# Patient Record
Sex: MALE | Race: WHITE | Employment: FULL TIME | URBAN - METROPOLITAN AREA
[De-identification: names, ages, dates, MRNs, and addresses within clinical notes are randomized per-mention and may not be internally consistent; named-entity substitution may affect disease eponyms.]

---

## 2019-08-07 ENCOUNTER — HOSPITAL ENCOUNTER (EMERGENCY)
Age: 34
Discharge: HOME | End: 2019-08-07
Attending: EMERGENCY MEDICINE
Payer: MEDICARE

## 2019-08-07 VITALS
SYSTOLIC BLOOD PRESSURE: 131 MMHG | HEIGHT: 71 IN | OXYGEN SATURATION: 98 % | WEIGHT: 273.15 LBS | HEART RATE: 76 BPM | BODY MASS INDEX: 38.24 KG/M2 | RESPIRATION RATE: 16 BRPM | DIASTOLIC BLOOD PRESSURE: 75 MMHG | TEMPERATURE: 97.8 F

## 2019-08-07 DIAGNOSIS — J20.9 ACUTE BRONCHITIS, UNSPECIFIED ORGANISM: Primary | ICD-10-CM

## 2019-08-07 PROCEDURE — 99282 EMERGENCY DEPT VISIT SF MDM: CPT

## 2019-08-07 RX ORDER — AZITHROMYCIN 250 MG/1
TABLET, FILM COATED ORAL
Qty: 6 TAB | Refills: 0 | Status: SHIPPED | OUTPATIENT
Start: 2019-08-07 | End: 2020-01-23

## 2019-08-07 NOTE — ED PROVIDER NOTES
HPI the patient is a 59-year-old white male  who presents to the emergency room with acute symptoms of upper respiratory infection with runny nose and nasal congestion and a cough productive of scant yellow sputum. He was concerned about the possibility of sinusitis. He has no tooth or face pain associated with this. He was offered an x-ray of his sinuses but declined.   I will treat him for a bronchitis with Z-Darshan with close follow-up by his PCP    Past Medical History:   Diagnosis Date    Allergic rhinitis due to pollen     Asthma     Bipolar I disorder, most recent episode (or current) depressed, unspecified     Depression     Gastrointestinal disorder     gerd    GERD (gastroesophageal reflux disease)     Otitis media     Tick bite 6/8/2010       Past Surgical History:   Procedure Laterality Date    HX ADENOIDECTOMY      age 8   [facility] ENDOSCOPY      HX TONSILLECTOMY      age 8   [facility] TYMPANOPLASTY      STRABISMUS SURG,REPAIR DETACH [address]           Family History:   Problem Relation Age of Onset    Diabetes Father        Social History     Socioeconomic History    Marital status:      Spouse name: Not on file    Number of children: Not on file    Years of education: Not on file    Highest education level: Not on file   Occupational History    Not on file   Social Needs    Financial resource strain: Not on file    Food insecurity:     Worry: Not on file     Inability: Not on file    Transportation needs:     Medical: Not on file     Non-medical: Not on file   Tobacco Use    Smoking status: Never Smoker    Smokeless tobacco: Never Used   Substance and Sexual Activity    Alcohol use: No     Comment: once yearly     Drug use: No    Sexual activity: Yes     Partners: Female   Lifestyle    Physical activity:     Days per week: Not on file     Minutes per session: Not on file    Stress: Not on file   Relationships    Social connections:     Talks on phone: Not on file     Gets together: Not on file     Attends Yarsanism service: Not on file     Active member of club or organization: Not on file     Attends meetings of clubs or organizations: Not on file     Relationship status: Not on file    Intimate partner violence:     Fear of current or ex partner: Not on file     Emotionally abused: Not on file     Physically abused: Not on file     Forced sexual activity: Not on file   Other Topics Concern    Not on file   Social History Narrative    Not on file         ALLERGIES: Patient has no known allergies. Review of Systems   All other systems reviewed and are negative. Vitals:    08/07/19 1024   BP: 131/75   Pulse: 76   Resp: 16   Temp: 97.8 °F (36.6 °C)   SpO2: 98%   Weight: 123.9 kg (273 lb 2.4 oz)   Height: 5' 11\" (1.803 m)            Physical Exam   Constitutional: He is oriented to person, place, and time. He appears well-developed and well-nourished. HENT:   Head: Normocephalic and atraumatic. Mouth/Throat: Oropharynx is clear and moist. No oropharyngeal exudate. Eyes: Conjunctivae are normal. No scleral icterus. Neck: Neck supple. No thyromegaly present. Cardiovascular: Normal rate, regular rhythm and normal heart sounds. Exam reveals no gallop and no friction rub. No murmur heard. Pulmonary/Chest: Effort normal and breath sounds normal. No stridor. No respiratory distress. He has no wheezes. He has no rales. Abdominal: Soft. Bowel sounds are normal. There is no tenderness. There is no rebound and no guarding. Musculoskeletal: Normal range of motion. Lymphadenopathy:     He has no cervical adenopathy. Neurological: He is alert and oriented to person, place, and time. Skin: Skin is warm and dry. Psychiatric: He has a normal mood and affect. Nursing note and vitals reviewed.        MDM       Procedures

## 2019-08-07 NOTE — LETTER
[facility] EMERGENCY DEPT 
[address] [facility] [place] [address] 
[phone] Work/School Note Date: 8/7/2019 To Whom It May concern: 
 
Ajith Caba was seen and treated today in the emergency room by the following provider(s): 
Attending Provider: Augustine Rivas MD. Ajith Caba may return to work on 08/08/2019. Sincerely, Jamie Jones RN

## 2020-01-23 ENCOUNTER — HOSPITAL ENCOUNTER (EMERGENCY)
Age: 35
Discharge: HOME | End: 2020-01-23
Attending: EMERGENCY MEDICINE
Payer: MEDICARE

## 2020-01-23 VITALS
HEIGHT: 71 IN | BODY MASS INDEX: 39.29 KG/M2 | SYSTOLIC BLOOD PRESSURE: 142 MMHG | WEIGHT: 280.65 LBS | RESPIRATION RATE: 16 BRPM | HEART RATE: 100 BPM | TEMPERATURE: 98 F | DIASTOLIC BLOOD PRESSURE: 63 MMHG | OXYGEN SATURATION: 95 %

## 2020-01-23 DIAGNOSIS — J01.00 ACUTE MAXILLARY SINUSITIS, RECURRENCE NOT SPECIFIED: Primary | ICD-10-CM

## 2020-01-23 PROCEDURE — 99282 EMERGENCY DEPT VISIT SF MDM: CPT

## 2020-01-23 RX ORDER — AZITHROMYCIN 250 MG/1
TABLET, FILM COATED ORAL
Qty: 6 TAB | Refills: 0 | Status: SHIPPED | OUTPATIENT
Start: 2020-01-23 | End: 2022-10-24

## 2020-01-23 RX ORDER — DEXTROMETHORPHAN POLISTIREX 30 MG/5ML
60 SUSPENSION ORAL 2 TIMES DAILY
COMMUNITY

## 2020-01-23 NOTE — ED PROVIDER NOTES
HPI   The patient is a 81-year-old white male body aches cough pressure in his sinuses but no fever or chills. He has a history of bipolar disorder depression otitis media and is status post tonsillectomy in the past.  He denies any ear pain pressure over his maxillary and frontal sinuses.   Past Medical History:   Diagnosis Date    Allergic rhinitis due to pollen     Asthma     Bipolar I disorder, most recent episode (or current) depressed, unspecified     Depression     Gastrointestinal disorder     gerd    GERD (gastroesophageal reflux disease)     Otitis media     Tick bite 6/8/2010       Past Surgical History:   Procedure Laterality Date    HX ADENOIDECTOMY      age 8   [facility]      HX TONSILLECTOMY      age 8   [facility] TYMPANOPLASTY      STRABISMUS SURG,REPAIR DETACH [address]           Family History:   Problem Relation Age of Onset    Diabetes Father        Social History     Socioeconomic History    Marital status:      Spouse name: Not on file    Number of children: Not on file    Years of education: Not on file    Highest education level: Not on file   Occupational History    Not on file   Social Needs    Financial resource strain: Not on file    Food insecurity:     Worry: Not on file     Inability: Not on file    Transportation needs:     Medical: Not on file     Non-medical: Not on file   Tobacco Use    Smoking status: Never Smoker    Smokeless tobacco: Never Used   Substance and Sexual Activity    Alcohol use: No     Comment: once yearly     Drug use: No    Sexual activity: Yes     Partners: Female   Lifestyle    Physical activity:     Days per week: Not on file     Minutes per session: Not on file    Stress: Not on file   Relationships    Social connections:     Talks on phone: Not on file     Gets together: Not on file     Attends Shinto service: Not on file     Active member of club or organization: Not on file     Attends meetings of clubs or organizations: Not on file     Relationship status: Not on file    Intimate partner violence:     Fear of current or ex partner: Not on file     Emotionally abused: Not on file     Physically abused: Not on file     Forced sexual activity: Not on file   Other Topics Concern    Not on file   Social History Narrative    Not on file         ALLERGIES: Patient has no known allergies. Review of Systems   All other systems reviewed and are negative. Vitals:    01/23/20 1107   BP: 142/63   Pulse: 100   Resp: 16   Temp: 98 °F (36.7 °C)   SpO2: 95%   Weight: 127.3 kg (280 lb 10.3 oz)   Height: 5' 11\" (1.803 m)            Physical Exam  Vitals signs and nursing note reviewed. Constitutional:       Appearance: He is well-developed. HENT:      Head: Normocephalic and atraumatic. Comments: Tender over maxillary and frontal sinuses throat red but no increased tonsils or exudates neck supple     Mouth/Throat:      Pharynx: No oropharyngeal exudate. Eyes:      General: No scleral icterus. Conjunctiva/sclera: Conjunctivae normal.   Neck:      Musculoskeletal: Neck supple. Thyroid: No thyromegaly. Cardiovascular:      Rate and Rhythm: Normal rate and regular rhythm. Heart sounds: Normal heart sounds. No murmur. No friction rub. No gallop. Pulmonary:      Effort: Pulmonary effort is normal. No respiratory distress. Breath sounds: Normal breath sounds. No stridor. No wheezing or rales. Abdominal:      General: Bowel sounds are normal.      Palpations: Abdomen is soft. Tenderness: There is no tenderness. There is no guarding or rebound. Musculoskeletal: Normal range of motion. Lymphadenopathy:      Cervical: No cervical adenopathy. Skin:     General: Skin is warm and dry. Neurological:      Mental Status: He is alert and oriented to person, place, and time.           MDM       Procedures

## 2020-06-02 ENCOUNTER — HOSPITAL ENCOUNTER (EMERGENCY)
Age: 35
Discharge: HOME | End: 2020-06-02
Attending: EMERGENCY MEDICINE
Payer: MEDICARE

## 2020-06-02 ENCOUNTER — APPOINTMENT (OUTPATIENT)
Dept: CT IMAGING | Age: 35
End: 2020-06-02
Attending: EMERGENCY MEDICINE
Payer: MEDICARE

## 2020-06-02 VITALS
SYSTOLIC BLOOD PRESSURE: 127 MMHG | HEART RATE: 84 BPM | BODY MASS INDEX: 38.27 KG/M2 | RESPIRATION RATE: 15 BRPM | WEIGHT: 273.37 LBS | HEIGHT: 71 IN | TEMPERATURE: 97.6 F | DIASTOLIC BLOOD PRESSURE: 83 MMHG | OXYGEN SATURATION: 97 %

## 2020-06-02 DIAGNOSIS — M54.50 RIGHT-SIDED LOW BACK PAIN WITHOUT SCIATICA, UNSPECIFIED CHRONICITY: Primary | ICD-10-CM

## 2020-06-02 DIAGNOSIS — R31.9 HEMATURIA, UNSPECIFIED TYPE: ICD-10-CM

## 2020-06-02 LAB
APPEARANCE UR: CLEAR
BACTERIA URNS QL MICRO: ABNORMAL /HPF
BILIRUB UR QL: NEGATIVE
COLOR UR: ABNORMAL
EPITH CASTS URNS QL MICRO: ABNORMAL /LPF
GLUCOSE UR STRIP.AUTO-MCNC: NEGATIVE MG/DL
HGB UR QL STRIP: ABNORMAL
KETONES UR QL STRIP.AUTO: NEGATIVE MG/DL
LEUKOCYTE ESTERASE UR QL STRIP.AUTO: NEGATIVE
MUCOUS THREADS URNS QL MICRO: ABNORMAL /LPF
NITRITE UR QL STRIP.AUTO: NEGATIVE
PH UR STRIP: 5 [PH] (ref 5–8)
PROT UR STRIP-MCNC: NEGATIVE MG/DL
RBC #/AREA URNS HPF: ABNORMAL /HPF (ref 0–5)
SP GR UR REFRACTOMETRY: 1.02 (ref 1–1.03)
UR CULT HOLD, URHOLD: NORMAL
UROBILINOGEN UR QL STRIP.AUTO: 0.2 EU/DL (ref 0.2–1)
WBC URNS QL MICRO: ABNORMAL /HPF (ref 0–4)

## 2020-06-02 PROCEDURE — 81001 URINALYSIS AUTO W/SCOPE: CPT

## 2020-06-02 PROCEDURE — 74011250636 HC RX REV CODE- 250/636: Performed by: EMERGENCY MEDICINE

## 2020-06-02 PROCEDURE — 99283 EMERGENCY DEPT VISIT LOW MDM: CPT

## 2020-06-02 PROCEDURE — 74176 CT ABD & PELVIS W/O CONTRAST: CPT

## 2020-06-02 PROCEDURE — 96372 THER/PROPH/DIAG INJ SC/IM: CPT

## 2020-06-02 RX ORDER — PREDNISONE 50 MG/1
50 TABLET ORAL DAILY
Qty: 3 TAB | Refills: 0 | Status: SHIPPED | OUTPATIENT
Start: 2020-06-02 | End: 2020-06-05

## 2020-06-02 RX ORDER — KETOROLAC TROMETHAMINE 30 MG/ML
30 INJECTION, SOLUTION INTRAMUSCULAR; INTRAVENOUS ONCE
Status: COMPLETED | OUTPATIENT
Start: 2020-06-02 | End: 2020-06-02

## 2020-06-02 RX ORDER — METHOCARBAMOL 500 MG/1
1000 TABLET, FILM COATED ORAL 4 TIMES DAILY
Qty: 56 TAB | Refills: 0 | Status: SHIPPED | OUTPATIENT
Start: 2020-06-02 | End: 2020-06-09

## 2020-06-02 RX ADMIN — KETOROLAC TROMETHAMINE 30 MG: 30 INJECTION, SOLUTION INTRAMUSCULAR at 13:58

## 2020-06-02 NOTE — ED PROVIDER NOTES
Date of Service:  6/2/2020    Patient:  Jose L Cardona    Chief Complaint:  Back Pain and Hip Pain       HPI:  Jose L Cardona is a 30 y.o.  male who presents for evaluation of back pain. Patient complains of some right low back and flank pain for 2 days. He states it came up suddenly and seems to get worse in certain positions. He notes that if he stands too long or sits too long it aggravates it. Also if he tries to bend over to tie his shoes this makes it worse. He has no abdominal pain. No nausea vomiting diarrhea headaches fevers chills. No chest pain or shortness of breath. No other acute complaints. He does work as a  and is always \"up and down and lifting heavy stuff\" daily. No history of kidney stones. Otherwise he denies any other acute complaints.            Past Medical History:   Diagnosis Date    Allergic rhinitis due to pollen     Asthma     Bipolar I disorder, most recent episode (or current) depressed, unspecified     Depression     Gastrointestinal disorder     gerd    GERD (gastroesophageal reflux disease)     Otitis media     Tick bite 6/8/2010       Past Surgical History:   Procedure Laterality Date    HX ADENOIDECTOMY      age 8   [facility]      HX TONSILLECTOMY      age 8   [facility] TYMPAN[facility]LAST[facility]      STRABISMUS [address] DETACH [address]           Family History:   Problem Relation Age of Onset    Diabetes Father        Social History     Socioeconomic History    Marital status:      Spouse name: Not on file    Number of children: Not on file    Years of education: Not on file    Highest education level: Not on file   Occupational History    Not on file   Social Needs    Financial resource strain: Not on file    Food insecurity     Worry: Not on file     Inability: Not on file    Transportation needs     Medical: Not on file     Non-medical: Not on file   Tobacco Use    Smoking status: Never Smoker    Smokeless tobacco: Never Used   Substance and Sexual Activity    Alcohol use: No     Comment: once yearly     Drug use: No    Sexual activity: Yes     Partners: Female   Lifestyle    Physical activity     Days per week: Not on file     Minutes per session: Not on file    Stress: Not on file   Relationships    Social connections     Talks on phone: Not on file     Gets together: Not on file     Attends Catholic service: Not on file     Active member of club or organization: Not on file     Attends meetings of clubs or organizations: Not on file     Relationship status: Not on file    Intimate partner violence     Fear of current or ex partner: Not on file     Emotionally abused: Not on file     Physically abused: Not on file     Forced sexual activity: Not on file   Other Topics Concern    Not on file   Social History Narrative    Not on file         ALLERGIES: Patient has no known allergies. Review of Systems   Constitutional: Negative for fever. HENT: Negative for hearing loss. Eyes: Negative for visual disturbance. Respiratory: Negative for shortness of breath. Cardiovascular: Negative for chest pain. Gastrointestinal: Negative for abdominal pain. Genitourinary: Negative for flank pain. Musculoskeletal: Positive for back pain. Negative for gait problem, neck pain and neck stiffness. Skin: Negative for rash. Neurological: Negative for dizziness, light-headedness and numbness. Psychiatric/Behavioral: Negative for confusion. Vitals:    06/02/20 1120   BP: 127/83   Pulse: 84   Resp: 15   Temp: 97.6 °F (36.4 °C)   SpO2: 97%   Weight: 124 kg (273 lb 5.9 oz)   Height: 5' 11\" (1.803 m)            Physical Exam  Vitals signs and nursing note reviewed. Constitutional:       General: He is not in acute distress. Appearance: Normal appearance. He is well-developed. He is not ill-appearing, toxic-appearing or diaphoretic. HENT:      Head: Normocephalic and atraumatic.    Eyes:      Conjunctiva/sclera: Conjunctivae normal.   Neck:      Musculoskeletal: Neck supple. Vascular: No JVD. Trachea: No tracheal deviation. Cardiovascular:      Rate and Rhythm: Normal rate and regular rhythm. Heart sounds: No friction rub. Pulmonary:      Effort: Pulmonary effort is normal. No respiratory distress. Abdominal:      General: Abdomen is flat. There is no distension. Palpations: Abdomen is soft. Tenderness: There is no abdominal tenderness. There is no right CVA tenderness, left CVA tenderness, guarding or rebound. Negative signs include Canas's sign, McBurney's sign, psoas sign and obturator sign. Hernia: No hernia is present. Musculoskeletal:         General: No tenderness or deformity. Comments: No midline c/t/l tenderness. Bending at the waste reproduces the pain. The further he bends, the \"tighter\" it gets   Skin:     General: Skin is warm and dry. Capillary Refill: Capillary refill takes less than 2 seconds. Findings: No rash. Neurological:      Mental Status: He is alert and oriented to person, place, and time.    Psychiatric:         Mood and Affect: Mood normal.         Behavior: Behavior normal.          MDM  Number of Diagnoses or Management Options  Hematuria, unspecified type:   Right-sided low back pain without sciatica, unspecified chronicity:        VITAL SIGNS:  Patient Vitals for the past 4 hrs:   Temp Pulse Resp BP SpO2   06/02/20 1120 97.6 °F (36.4 °C) 84 15 127/83 97 %         LABS:  Recent Results (from the past 6 hour(s))   URINALYSIS W/ RFLX MICROSCOPIC    Collection Time: 06/02/20 11:49 AM   Result Value Ref Range    Color YELLOW/STRAW      Appearance CLEAR CLEAR      Specific gravity 1.025 1.003 - 1.030      pH (UA) 5.0 5.0 - 8.0      Protein Negative NEG mg/dL    Glucose Negative NEG mg/dL    Ketone Negative NEG mg/dL    Bilirubin Negative NEG      Blood MODERATE (A) NEG      Urobilinogen 0.2 0.2 - 1.0 EU/dL    Nitrites Negative NEG      Leukocyte Esterase Negative NEG     URINE CULTURE HOLD SAMPLE    Collection Time: 06/02/20 11:49 AM   Result Value Ref Range    Urine culture hold        Urine on hold in Microbiology dept for 2 days. If unpreserved urine is submitted, it cannot be used for addtional testing after 24 hours, recollection will be required. URINE MICROSCOPIC ONLY    Collection Time: 06/02/20 11:49 AM   Result Value Ref Range    WBC 0-4 0 - 4 /hpf    RBC 5-10 0 - 5 /hpf    Epithelial cells FEW FEW /lpf    Bacteria 1+ (A) NEG /hpf    Mucus 2+ (A) NEG /lpf        IMAGING:  CT ABD PELV WO CONT   Final Result   IMPRESSION:    1. Mild patchy multifocal airspace disease within the lingula, anterior right   middle lobe and anterior right lower lobe. Recommend clinical correlation. 2. 3 mm nonobstructing right renal calculus. Medications During Visit:  Medications   ketorolac tromethamine (TORADOL) 60 mg/2 mL injection 30 mg (has no administration in time range)         DECISION MAKING:  Karen Ocasio is a 30 y.o. male who comes in as above. Labs and imaging are as above. Here, patient does not appear distressed. No signs of kidney stones so the hematuria is unexplained however could be from dehydration. He could have also passed a stone. For now we will treat him as though it is musculoskeletal type discomfort given how gets better and worse based on positioning. Patient's CT results concerning the lung findings noted on CT or discussed however patient has absolutely no respiratory symptoms nor has he ever had a fever or any other complaints. Return and follow-up instructions discussed. Patient is agreeable to this plan      IMPRESSION:  1. Right-sided low back pain without sciatica, unspecified chronicity    2.  Hematuria, unspecified type        DISPOSITION:  Discharged      Current Discharge Medication List      START taking these medications    Details   methocarbamoL (Robaxin) 500 mg tablet Take 2 Tabs by mouth four (4) times daily for 7 days. Qty: 56 Tab, Refills: 0      predniSONE (DELTASONE) 50 mg tablet Take 1 Tab by mouth daily for 3 days. Qty: 3 Tab, Refills: 0              Follow-up Information     Follow up With Specialties Details Why Contact Info    Jonah Ortiz MD Family [facility] Schedule an appointment as soon as possible for a visit   [facility]  [phone]              The patient is asked to follow-up with their primary care provider in the next several days. They are to call tomorrow for an appointment. The patient is asked to return promptly for any increased concerns or worsening of symptoms. They can return to this emergency department or any other emergency department.     Procedures

## 2020-06-02 NOTE — ED TRIAGE NOTES
Patient ambulated with a steady gait from the lobby to room 4. In bathroom now giving a urine sample. C/O 1 day of right sided back and hip pain. No trouble with voiding. No other complaints. No nausea or vomiting. Is a  and does heavy duty lifting and twisting.  \"My heated car seats seemed to help the pain on the way here\"

## 2020-06-15 ENCOUNTER — HOSPITAL ENCOUNTER (OUTPATIENT)
Dept: GENERAL RADIOLOGY | Age: 35
Discharge: HOME | End: 2020-06-15
Attending: FAMILY MEDICINE
Payer: MEDICARE

## 2020-06-15 ENCOUNTER — HOSPITAL ENCOUNTER (OUTPATIENT)
Dept: CT IMAGING | Age: 35
Discharge: HOME | End: 2020-06-15
Attending: FAMILY MEDICINE
Payer: MEDICARE

## 2020-06-15 DIAGNOSIS — R91.8 LUNG MASS: ICD-10-CM

## 2020-06-15 DIAGNOSIS — N20.0 CALCULUS OF KIDNEY: ICD-10-CM

## 2020-06-15 PROCEDURE — 74176 CT ABD & PELVIS W/O CONTRAST: CPT

## 2020-06-15 PROCEDURE — 71046 X-RAY EXAM CHEST 2 VIEWS: CPT

## 2020-12-06 NOTE — ED NOTES
The patient was discharged home by Dr Fredna Cooks in stable condition. The patient is alert and oriented, in no respiratory distress and discharge vital signs obtained. The patient's diagnosis, condition and treatment were explained. The patient expressed understanding. A discharge plan has been developed. A  was not involved in the process. Aftercare instructions were given. Pt ambulatory out of the ED.  Patient tolerated IM medication well None

## 2022-04-19 ENCOUNTER — HOSPITAL ENCOUNTER (EMERGENCY)
Age: 37
Discharge: HOME | End: 2022-04-19
Attending: EMERGENCY MEDICINE
Payer: MEDICARE

## 2022-04-19 VITALS
BODY MASS INDEX: 39.2 KG/M2 | RESPIRATION RATE: 17 BRPM | HEIGHT: 71 IN | WEIGHT: 280 LBS | SYSTOLIC BLOOD PRESSURE: 112 MMHG | DIASTOLIC BLOOD PRESSURE: 74 MMHG | TEMPERATURE: 97.7 F | OXYGEN SATURATION: 99 % | HEART RATE: 73 BPM

## 2022-04-19 DIAGNOSIS — R42 DIZZINESS: Primary | ICD-10-CM

## 2022-04-19 LAB
ALBUMIN SERPL-MCNC: 3.6 G/DL (ref 3.5–5)
ALBUMIN/GLOB SERPL: 0.9 {RATIO} (ref 1.1–2.2)
ALP SERPL-CCNC: 105 U/L (ref 45–117)
ALT SERPL-CCNC: 30 U/L (ref 12–78)
ANION GAP SERPL CALC-SCNC: 6 MMOL/L (ref 5–15)
AST SERPL-CCNC: 19 U/L (ref 15–37)
BASOPHILS # BLD: 0 K/UL (ref 0–0.1)
BASOPHILS NFR BLD: 0 % (ref 0–1)
BILIRUB SERPL-MCNC: 0.4 MG/DL (ref 0.2–1)
BUN SERPL-MCNC: 14 MG/DL (ref 6–20)
BUN/CREAT SERPL: 17 (ref 12–20)
CALCIUM SERPL-MCNC: 8.4 MG/DL (ref 8.5–10.1)
CHLORIDE SERPL-SCNC: 108 MMOL/L (ref 97–108)
CO2 SERPL-SCNC: 27 MMOL/L (ref 21–32)
CREAT SERPL-MCNC: 0.83 MG/DL (ref 0.7–1.3)
DIFFERENTIAL METHOD BLD: NORMAL
EOSINOPHIL # BLD: 0.2 K/UL (ref 0–0.4)
EOSINOPHIL NFR BLD: 3 % (ref 0–7)
ERYTHROCYTE [DISTWIDTH] IN BLOOD BY AUTOMATED COUNT: 13.1 % (ref 11.5–14.5)
GLOBULIN SER CALC-MCNC: 3.9 G/DL (ref 2–4)
GLUCOSE BLD STRIP.AUTO-MCNC: 87 MG/DL (ref 65–117)
GLUCOSE SERPL-MCNC: 94 MG/DL (ref 65–100)
HCT VFR BLD AUTO: 44.4 % (ref 36.6–50.3)
HGB BLD-MCNC: 14.1 G/DL (ref 12.1–17)
IMM GRANULOCYTES # BLD AUTO: 0 K/UL (ref 0–0.04)
IMM GRANULOCYTES NFR BLD AUTO: 0 % (ref 0–0.5)
LYMPHOCYTES # BLD: 1.7 K/UL (ref 0.8–3.5)
LYMPHOCYTES NFR BLD: 26 % (ref 12–49)
MCH RBC QN AUTO: 26.5 PG (ref 26–34)
MCHC RBC AUTO-ENTMCNC: 31.8 G/DL (ref 30–36.5)
MCV RBC AUTO: 83.5 FL (ref 80–99)
MONOCYTES # BLD: 0.6 K/UL (ref 0–1)
MONOCYTES NFR BLD: 9 % (ref 5–13)
NEUTS SEG # BLD: 4.1 K/UL (ref 1.8–8)
NEUTS SEG NFR BLD: 62 % (ref 32–75)
NRBC # BLD: 0 K/UL (ref 0–0.01)
NRBC BLD-RTO: 0 PER 100 WBC
PLATELET # BLD AUTO: 256 K/UL (ref 150–400)
PMV BLD AUTO: 11.8 FL (ref 8.9–12.9)
POTASSIUM SERPL-SCNC: 4.3 MMOL/L (ref 3.5–5.1)
PROT SERPL-MCNC: 7.5 G/DL (ref 6.4–8.2)
RBC # BLD AUTO: 5.32 M/UL (ref 4.1–5.7)
SERVICE CMNT-IMP: NORMAL
SODIUM SERPL-SCNC: 141 MMOL/L (ref 136–145)
WBC # BLD AUTO: 6.5 K/UL (ref 4.1–11.1)

## 2022-04-19 PROCEDURE — 36415 COLL VENOUS BLD VENIPUNCTURE: CPT

## 2022-04-19 PROCEDURE — 85025 COMPLETE CBC W/AUTO DIFF WBC: CPT

## 2022-04-19 PROCEDURE — 93005 ELECTROCARDIOGRAM TRACING: CPT

## 2022-04-19 PROCEDURE — 82962 GLUCOSE BLOOD TEST: CPT

## 2022-04-19 PROCEDURE — 99284 EMERGENCY DEPT VISIT MOD MDM: CPT

## 2022-04-19 PROCEDURE — 80053 COMPREHEN METABOLIC PANEL: CPT

## 2022-04-19 RX ORDER — MECLIZINE HYDROCHLORIDE 25 MG/1
25 TABLET ORAL
Qty: 30 TABLET | Refills: 0 | Status: SHIPPED | OUTPATIENT
Start: 2022-04-19 | End: 2022-04-29

## 2022-04-19 RX ORDER — SERTRALINE HYDROCHLORIDE 50 MG/1
50 TABLET, FILM COATED ORAL DAILY
COMMUNITY

## 2022-04-19 NOTE — ED NOTES
MD and RN have reviewed and gave discharge instructions and prescription to the patient. The patient verbalized understanding. The pt left ambulatory with wife w/o complaints of dizziness or SOB.

## 2022-04-19 NOTE — ED PROVIDER NOTES
HPI the patient woke up this morning feeling dizzy and nauseated. He also admits to having shortness of breath. He denies vomiting, fever, chest pain, headache or cough. He was diagnosed with COVID 1 week ago. He had no symptoms and says that his sister developed COVID and he was tested as a contact. Past Medical History:   Diagnosis Date    Allergic rhinitis due to pollen     Asthma     Bipolar I disorder, most recent episode (or current) depressed, unspecified     Depression     Gastrointestinal disorder     gerd    GERD (gastroesophageal reflux disease)     Otitis media     Tick bite 6/8/2010       Past Surgical History:   Procedure Laterality Date    HX ADENOIDECTOMY      age 8   [de-identified] ENDOSCOPY      HX TONSILLECTOMY      age 8   [de-identified] TYMPANOPLASTY      STRABISMUS [address]           Family History:   Problem Relation Age of Onset    Diabetes Father        Social History     Socioeconomic History    Marital status:      Spouse name: Not on file    Number of children: Not on file    Years of education: Not on file    Highest education level: Not on file   Occupational History    Not on file   Tobacco Use    Smoking status: Never Smoker    Smokeless tobacco: Never Used   Substance and Sexual Activity    Alcohol use: No     Comment: once yearly     Drug use: No    Sexual activity: Yes     Partners: Female   Other Topics Concern    Not on file   Social History Narrative    Not on file     Social Determinants of Health     Financial Resource Strain:     Difficulty of Paying Living Expenses: Not on file   Food Insecurity:     Worried About [address] in the Last Year: Not on file    [address] in the Last Year: Not on file   Transportation Needs:     Lack of Transportation (Medical): Not on file    Lack of Transportation (Non-Medical):  Not on file   Physical Activity:     Days of Exercise per Week: Not on file    Minutes of Exercise per Session: Not on file   Stress:     Feeling of Stress : Not on file   Social Connections:     Frequency of Communication with Friends and Family: Not on file    Frequency of Social Gatherings with Friends and Family: Not on file    Attends Restorationism Services: Not on file    Active Member of Clubs or Organizations: Not on file    Attends Club or Organization Meetings: Not on file    Marital Status: Not on file   Intimate Partner Violence:     Fear of Current or Ex-Partner: Not on file    Emotionally Abused: Not on file    Physically Abused: Not on file    Sexually Abused: Not on file   Housing Stability:     Unable to Pay for Housing in the Last Year: Not on file    Number of [place] in the Last Year: Not on file    Unstable Housing in the Last Year: Not on file         ALLERGIES: Patient has no known allergies. Review of Systems   Constitutional: Negative for fever. HENT: Negative for voice change. Eyes: Negative for visual disturbance. Respiratory: Positive for shortness of breath. Negative for cough. Cardiovascular: Negative for chest pain. Gastrointestinal: Positive for nausea. Negative for abdominal pain and vomiting. Genitourinary: Negative for flank pain. Musculoskeletal: Negative for back pain. Skin: Negative for rash. Neurological: Positive for dizziness. Negative for syncope and headaches. Psychiatric/Behavioral: Negative for confusion. Vitals:    04/19/22 0757   BP: 128/87   Pulse: 78   Resp: 15   Temp: 97.8 °F (36.6 °C)   SpO2: 98%   Weight: 127 kg (280 lb)   Height: 5' 11\" (1.803 m)            Physical Exam  Constitutional:       General: He is not in acute distress. Appearance: He is well-developed. HENT:      Head: Normocephalic. Eyes:      Pupils: Pupils are equal, round, and reactive to light. Cardiovascular:      Rate and Rhythm: Normal rate. Heart sounds: No murmur heard.       Pulmonary:      Effort: Pulmonary effort is normal.      Breath sounds: Normal breath sounds. Abdominal:      Palpations: Abdomen is soft. Tenderness: There is no abdominal tenderness. Musculoskeletal:         General: Normal range of motion. Cervical back: Normal range of motion. Skin:     General: Skin is warm and dry. Capillary Refill: Capillary refill takes less than 2 seconds. Neurological:      Mental Status: He is alert. Psychiatric:         Behavior: Behavior normal.          MDM       Procedures ED EKG interpretation:  Rhythm: NSR, rate 75. No acute ST changes. This EKG was interpreted by Celia Cooper MD,ED Provider.

## 2022-04-19 NOTE — ED TRIAGE NOTES
Pt arrived via car and needed assistance with getting out of the car. Pt complaining of dizziness starting at 0400 when awoke.   Pt also complains of SOB when \"I get up\"

## 2022-04-27 LAB
ATRIAL RATE: 75 BPM
CALCULATED P AXIS, ECG09: 30 DEGREES
CALCULATED R AXIS, ECG10: -18 DEGREES
CALCULATED T AXIS, ECG11: 51 DEGREES
DIAGNOSIS, 93000: NORMAL
P-R INTERVAL, ECG05: 202 MS
Q-T INTERVAL, ECG07: 376 MS
QRS DURATION, ECG06: 76 MS
QTC CALCULATION (BEZET), ECG08: 419 MS
VENTRICULAR RATE, ECG03: 75 BPM

## 2022-10-24 ENCOUNTER — APPOINTMENT (OUTPATIENT)
Dept: CT IMAGING | Age: 37
End: 2022-10-24
Attending: EMERGENCY MEDICINE
Payer: MEDICARE

## 2022-10-24 ENCOUNTER — HOSPITAL ENCOUNTER (EMERGENCY)
Age: 37
Discharge: HOME | End: 2022-10-24
Attending: EMERGENCY MEDICINE
Payer: MEDICARE

## 2022-10-24 ENCOUNTER — APPOINTMENT (OUTPATIENT)
Dept: ULTRASOUND IMAGING | Age: 37
End: 2022-10-24
Attending: EMERGENCY MEDICINE
Payer: MEDICARE

## 2022-10-24 VITALS
DIASTOLIC BLOOD PRESSURE: 63 MMHG | HEART RATE: 95 BPM | TEMPERATURE: 97.9 F | HEIGHT: 71 IN | BODY MASS INDEX: 41.05 KG/M2 | WEIGHT: 293.21 LBS | RESPIRATION RATE: 18 BRPM | SYSTOLIC BLOOD PRESSURE: 109 MMHG | OXYGEN SATURATION: 98 %

## 2022-10-24 DIAGNOSIS — R10.13 ABDOMINAL PAIN, EPIGASTRIC: Primary | ICD-10-CM

## 2022-10-24 LAB
ALBUMIN SERPL-MCNC: 3.7 G/DL (ref 3.5–5)
ALBUMIN/GLOB SERPL: 1 {RATIO} (ref 1.1–2.2)
ALP SERPL-CCNC: 108 U/L (ref 45–117)
ALT SERPL-CCNC: 29 U/L (ref 12–78)
ANION GAP SERPL CALC-SCNC: 7 MMOL/L (ref 5–15)
AST SERPL-CCNC: 19 U/L (ref 15–37)
BASOPHILS # BLD: 0 K/UL (ref 0–0.1)
BASOPHILS NFR BLD: 0 % (ref 0–1)
BILIRUB SERPL-MCNC: 0.3 MG/DL (ref 0.2–1)
BUN SERPL-MCNC: 15 MG/DL (ref 6–20)
BUN/CREAT SERPL: 19 (ref 12–20)
CALCIUM SERPL-MCNC: 8.8 MG/DL (ref 8.5–10.1)
CHLORIDE SERPL-SCNC: 106 MMOL/L (ref 97–108)
CO2 SERPL-SCNC: 26 MMOL/L (ref 21–32)
CREAT SERPL-MCNC: 0.79 MG/DL (ref 0.7–1.3)
DIFFERENTIAL METHOD BLD: ABNORMAL
EOSINOPHIL # BLD: 0.1 K/UL (ref 0–0.4)
EOSINOPHIL NFR BLD: 1 % (ref 0–7)
ERYTHROCYTE [DISTWIDTH] IN BLOOD BY AUTOMATED COUNT: 13.2 % (ref 11.5–14.5)
GLOBULIN SER CALC-MCNC: 3.8 G/DL (ref 2–4)
GLUCOSE SERPL-MCNC: 87 MG/DL (ref 65–100)
HCT VFR BLD AUTO: 42.2 % (ref 36.6–50.3)
HGB BLD-MCNC: 14 G/DL (ref 12.1–17)
IMM GRANULOCYTES # BLD AUTO: 0.1 K/UL (ref 0–0.04)
IMM GRANULOCYTES NFR BLD AUTO: 1 % (ref 0–0.5)
LIPASE SERPL-CCNC: 62 U/L (ref 73–393)
LYMPHOCYTES # BLD: 1.8 K/UL (ref 0.8–3.5)
LYMPHOCYTES NFR BLD: 19 % (ref 12–49)
MCH RBC QN AUTO: 28 PG (ref 26–34)
MCHC RBC AUTO-ENTMCNC: 33.2 G/DL (ref 30–36.5)
MCV RBC AUTO: 84.4 FL (ref 80–99)
MONOCYTES # BLD: 0.6 K/UL (ref 0–1)
MONOCYTES NFR BLD: 7 % (ref 5–13)
NEUTS SEG # BLD: 6.8 K/UL (ref 1.8–8)
NEUTS SEG NFR BLD: 73 % (ref 32–75)
NRBC # BLD: 0 K/UL (ref 0–0.01)
NRBC BLD-RTO: 0 PER 100 WBC
PLATELET # BLD AUTO: 252 K/UL (ref 150–400)
PMV BLD AUTO: 11.4 FL (ref 8.9–12.9)
POTASSIUM SERPL-SCNC: 4.1 MMOL/L (ref 3.5–5.1)
PROT SERPL-MCNC: 7.5 G/DL (ref 6.4–8.2)
RBC # BLD AUTO: 5 M/UL (ref 4.1–5.7)
SODIUM SERPL-SCNC: 139 MMOL/L (ref 136–145)
WBC # BLD AUTO: 9.4 K/UL (ref 4.1–11.1)

## 2022-10-24 PROCEDURE — 80053 COMPREHEN METABOLIC PANEL: CPT

## 2022-10-24 PROCEDURE — 85025 COMPLETE CBC W/AUTO DIFF WBC: CPT

## 2022-10-24 PROCEDURE — 74177 CT ABD & PELVIS W/CONTRAST: CPT

## 2022-10-24 PROCEDURE — 74011000636 HC RX REV CODE- 636: Performed by: EMERGENCY MEDICINE

## 2022-10-24 PROCEDURE — 76705 ECHO EXAM OF ABDOMEN: CPT

## 2022-10-24 PROCEDURE — 36415 COLL VENOUS BLD VENIPUNCTURE: CPT

## 2022-10-24 PROCEDURE — 83690 ASSAY OF LIPASE: CPT

## 2022-10-24 PROCEDURE — 99285 EMERGENCY DEPT VISIT HI MDM: CPT

## 2022-10-24 RX ORDER — OMEPRAZOLE 40 MG/1
40 CAPSULE, DELAYED RELEASE ORAL DAILY
Qty: 30 CAPSULE | Refills: 0 | Status: SHIPPED | OUTPATIENT
Start: 2022-10-24 | End: 2022-11-23

## 2022-10-24 RX ADMIN — IOPAMIDOL 100 ML: 755 INJECTION, SOLUTION INTRAVENOUS at 14:36

## 2022-10-24 NOTE — ED NOTES
The patient was discharged by Dr. Zhou De Guzman. One prescription e-scribed to pharmacy. Patient ambulatory to discharge.

## 2022-10-24 NOTE — ED PROVIDER NOTES
The history is provided by the patient and the spouse. Abdominal Pain   This is a new problem. The current episode started 2 days ago. The problem occurs constantly. The problem has not changed since onset. The pain is associated with vomiting. The pain is located in the epigastric region. The quality of the pain is cramping. The pain is moderate. Associated symptoms include nausea and vomiting. Pertinent negatives include no anorexia, no fever, no belching, no diarrhea, no flatus, no hematochezia, no melena, no constipation, no dysuria, no frequency, no hematuria, no headaches, no arthralgias, no myalgias, no trauma, no chest pain, no testicular pain and no back pain. The pain is worsened by eating. The pain is relieved by Nothing. The patient's surgical history non-contributory.      Past Medical History:   Diagnosis Date    Allergic rhinitis due to pollen     Asthma     Bipolar I disorder, most recent episode (or current) depressed, unspecified     Depression     Gastrointestinal disorder     gerd    GERD (gastroesophageal reflux disease)     Otitis media     Tick bite 6/8/2010       Past Surgical History:   Procedure Laterality Date    HX ADENOIDECTOMY      age 8   [de-identified] ENDOSCOPY      HX TONSILLECTOMY      age 8   [de-identified] TYMPANOPLASTY      [place] [address] DETACH [address]           Family History:   Problem Relation Age of Onset    Diabetes Father        Social History     Socioeconomic History    Marital status:      Spouse name: Not on file    Number of children: Not on file    Years of education: Not on file    Highest education level: Not on file   Occupational History    Not on file   Tobacco Use    Smoking status: Never    Smokeless tobacco: Never   Substance and Sexual Activity    Alcohol use: No     Comment: once yearly     Drug use: No    Sexual activity: Yes     Partners: Female   Other Topics Concern    Not on file   Social History Narrative    Not on file     Social Determinants of Health     Financial Resource Strain: Not on file   Food Insecurity: Not on file   Transportation Needs: Not on file   Physical Activity: Not on file   Stress: Not on file   Social Connections: Not on file   Intimate Partner Violence: Not on file   Housing Stability: Not on file         ALLERGIES: Patient has no known allergies. Review of Systems   Constitutional:  Negative for activity change, chills and fever. HENT:  Negative for nosebleeds, sore throat, trouble swallowing and voice change. Eyes:  Negative for visual disturbance. Respiratory:  Negative for shortness of breath. Cardiovascular:  Negative for chest pain and palpitations. Gastrointestinal:  Positive for abdominal pain, nausea and vomiting. Negative for anorexia, constipation, diarrhea, flatus, hematochezia and melena. Genitourinary:  Negative for difficulty urinating, dysuria, frequency, hematuria, testicular pain and urgency. Musculoskeletal:  Negative for arthralgias, back pain, myalgias, neck pain and neck stiffness. Skin:  Negative for color change. Allergic/Immunologic: Negative for immunocompromised state. Neurological:  Negative for dizziness, seizures, syncope, weakness, light-headedness, numbness and headaches. Psychiatric/Behavioral:  Negative for behavioral problems, confusion, hallucinations, self-injury and suicidal ideas. Vitals:    10/24/22 1231   BP: 134/88   Pulse: 95   Resp: 18   Temp: 97.9 °F (36.6 °C)   SpO2: 96%   Weight: 133 kg (293 lb 3.4 oz)   Height: 5' 11\" (1.803 m)            Physical Exam  Vitals and nursing note reviewed. Constitutional:       General: He is not in acute distress. Appearance: He is well-developed. He is not diaphoretic. HENT:      Head: Normocephalic and atraumatic. Eyes:      Pupils: Pupils are equal, round, and reactive to light. Cardiovascular:      Rate and Rhythm: Normal rate and regular rhythm. Heart sounds: Normal heart sounds.  No murmur heard. No friction rub. No gallop. Pulmonary:      Effort: Pulmonary effort is normal. No respiratory distress. Breath sounds: Normal breath sounds. No wheezing. Abdominal:      General: Bowel sounds are normal. There is no distension. Palpations: Abdomen is soft. Tenderness: There is no abdominal tenderness. There is no guarding or rebound. Musculoskeletal:         General: Normal range of motion. Cervical back: Normal range of motion and neck supple. Skin:     General: Skin is warm. Findings: No rash. Neurological:      Mental Status: He is alert and oriented to person, place, and time. Psychiatric:         Behavior: Behavior normal.         Thought Content: Thought content normal.         Judgment: Judgment normal.        MDM    This is a 53-year-old male with past medical history, review of systems, physical exam as above, presenting with complaints of epigastric abdominal pain. Patient states discomfort began over the weekend, states is made worse by eating and drinking. He endorses nausea, with vomiting, dry heaving this morning. He states bowels have been loose, without complaints of diarrhea. He denies fever. He states he has been taking ibuprofen for discomfort with little relief. He denies a history of tobacco or alcohol use, denies a history of abdominal surgeries. Physical exam is remarkable for well-appearing young male, in no acute distress noted to be normotensive, afebrile without tachycardia, satting well on room air. He has a soft abdomen, without inducible tenderness, clear breath sounds to auscultation, regular rate and rhythm without murmurs gallops or rubs. Discussed with patient and wife at bedside, differential includes biliary colic, pancreatitis, gastroenteritis. He states pain is minimal at this time, thus will defer medications, obtain CMP, CBC, UA, lipase, right upper quadrant ultrasound.   We will consider further imaging, reassess, and make a disposition. Procedures    Update:  Lab work, CT imaging, ultrasound without acute process, patient remains in no acute distress. Discussed results at bedside with patient, where he discloses history of GERD, requiring endoscopy, no longer taking PPI. Will encourage him to restart his PPI as this is likely gastritis, follow with his primary care physician and gastroenterology, return precautions given. Patient in agreement with plan.

## 2022-10-24 NOTE — ED TRIAGE NOTES
Pt reports \"I think I'm having issues with my gallbladder\". Pt reports mid abdominal pain over the weekend, abdomen feels \"tight\". Pt vomited and had diarrhea Saturday night, diarrhea has continued.

## 2025-07-01 ENCOUNTER — HOSPITAL ENCOUNTER (EMERGENCY)
Facility: HOSPITAL | Age: 40
Discharge: ANOTHER HOSPITAL | End: 2025-07-02
Attending: EMERGENCY MEDICINE

## 2025-07-01 DIAGNOSIS — R45.851 SUICIDAL IDEATION: Primary | ICD-10-CM

## 2025-07-01 LAB
ALBUMIN SERPL-MCNC: 3.8 G/DL (ref 3.5–5)
ALBUMIN/GLOB SERPL: 1.1 (ref 1.1–2.2)
ALP SERPL-CCNC: 118 U/L (ref 45–117)
ALT SERPL-CCNC: 32 U/L (ref 12–78)
AMPHET UR QL SCN: NEGATIVE
ANION GAP SERPL CALC-SCNC: 8 MMOL/L (ref 2–12)
APAP SERPL-MCNC: <2 UG/ML (ref 10–30)
APPEARANCE UR: ABNORMAL
AST SERPL-CCNC: 20 U/L (ref 15–37)
BACTERIA URNS QL MICRO: NEGATIVE /HPF
BARBITURATES UR QL SCN: NEGATIVE
BASOPHILS # BLD: 0.03 K/UL (ref 0–0.1)
BASOPHILS NFR BLD: 0.3 % (ref 0–1)
BENZODIAZ UR QL: NEGATIVE
BILIRUB SERPL-MCNC: 0.6 MG/DL (ref 0.2–1)
BILIRUB UR QL: NEGATIVE
BUN SERPL-MCNC: 11 MG/DL (ref 6–20)
BUN/CREAT SERPL: 13 (ref 12–20)
CALCIUM SERPL-MCNC: 9 MG/DL (ref 8.5–10.1)
CANNABINOIDS UR QL SCN: NEGATIVE
CHLORIDE SERPL-SCNC: 103 MMOL/L (ref 97–108)
CO2 SERPL-SCNC: 29 MMOL/L (ref 21–32)
COCAINE UR QL SCN: NEGATIVE
COLOR UR: ABNORMAL
CREAT SERPL-MCNC: 0.83 MG/DL (ref 0.7–1.3)
DIFFERENTIAL METHOD BLD: NORMAL
EOSINOPHIL # BLD: 0.06 K/UL (ref 0–0.4)
EOSINOPHIL NFR BLD: 0.7 % (ref 0–7)
EPITH CASTS URNS QL MICRO: ABNORMAL /LPF
ERYTHROCYTE [DISTWIDTH] IN BLOOD BY AUTOMATED COUNT: 12.5 % (ref 11.5–14.5)
ETHANOL SERPL-MCNC: <10 MG/DL (ref 0–0.08)
GLOBULIN SER CALC-MCNC: 3.6 G/DL (ref 2–4)
GLUCOSE SERPL-MCNC: 105 MG/DL (ref 65–100)
GLUCOSE UR STRIP.AUTO-MCNC: NEGATIVE MG/DL
HCT VFR BLD AUTO: 44.1 % (ref 36.6–50.3)
HGB BLD-MCNC: 15.2 G/DL (ref 12.1–17)
HGB UR QL STRIP: NEGATIVE
IMM GRANULOCYTES # BLD AUTO: 0.02 K/UL (ref 0–0.04)
IMM GRANULOCYTES NFR BLD AUTO: 0.2 % (ref 0–0.5)
KETONES UR QL STRIP.AUTO: NEGATIVE MG/DL
LEUKOCYTE ESTERASE UR QL STRIP.AUTO: NEGATIVE
LYMPHOCYTES # BLD: 1.89 K/UL (ref 0.8–3.5)
LYMPHOCYTES NFR BLD: 20.7 % (ref 12–49)
Lab: NORMAL
MCH RBC QN AUTO: 29.2 PG (ref 26–34)
MCHC RBC AUTO-ENTMCNC: 34.5 G/DL (ref 30–36.5)
MCV RBC AUTO: 84.8 FL (ref 80–99)
METHADONE UR QL: NEGATIVE
MONOCYTES # BLD: 0.65 K/UL (ref 0–1)
MONOCYTES NFR BLD: 7.1 % (ref 5–13)
MUCOUS THREADS URNS QL MICRO: ABNORMAL /LPF
NEUTS SEG # BLD: 6.49 K/UL (ref 1.8–8)
NEUTS SEG NFR BLD: 71 % (ref 32–75)
NITRITE UR QL STRIP.AUTO: NEGATIVE
NRBC # BLD: 0 K/UL (ref 0–0.01)
NRBC BLD-RTO: 0 PER 100 WBC
OPIATES UR QL: NEGATIVE
PCP UR QL: NEGATIVE
PH UR STRIP: 6.5 (ref 5–8)
PLATELET # BLD AUTO: 258 K/UL (ref 150–400)
PMV BLD AUTO: 11.3 FL (ref 8.9–12.9)
POTASSIUM SERPL-SCNC: 3.6 MMOL/L (ref 3.5–5.1)
PROT SERPL-MCNC: 7.4 G/DL (ref 6.4–8.2)
PROT UR STRIP-MCNC: NEGATIVE MG/DL
RBC # BLD AUTO: 5.2 M/UL (ref 4.1–5.7)
RBC #/AREA URNS HPF: ABNORMAL /HPF (ref 0–5)
SALICYLATES SERPL-MCNC: <1.7 MG/DL (ref 2.8–20)
SODIUM SERPL-SCNC: 140 MMOL/L (ref 136–145)
SP GR UR REFRACTOMETRY: 1.02 (ref 1–1.03)
UROBILINOGEN UR QL STRIP.AUTO: 1 EU/DL (ref 0.2–1)
WBC # BLD AUTO: 9.1 K/UL (ref 4.1–11.1)
WBC URNS QL MICRO: ABNORMAL /HPF (ref 0–4)

## 2025-07-01 PROCEDURE — 81001 URINALYSIS AUTO W/SCOPE: CPT

## 2025-07-01 PROCEDURE — 99283 EMERGENCY DEPT VISIT LOW MDM: CPT

## 2025-07-01 PROCEDURE — 80179 DRUG ASSAY SALICYLATE: CPT

## 2025-07-01 PROCEDURE — 99285 EMERGENCY DEPT VISIT HI MDM: CPT

## 2025-07-01 PROCEDURE — 82077 ASSAY SPEC XCP UR&BREATH IA: CPT

## 2025-07-01 PROCEDURE — 80053 COMPREHEN METABOLIC PANEL: CPT

## 2025-07-01 PROCEDURE — 80143 DRUG ASSAY ACETAMINOPHEN: CPT

## 2025-07-01 PROCEDURE — 80307 DRUG TEST PRSMV CHEM ANLYZR: CPT

## 2025-07-01 PROCEDURE — 85025 COMPLETE CBC W/AUTO DIFF WBC: CPT

## 2025-07-01 ASSESSMENT — LIFESTYLE VARIABLES
HOW OFTEN DO YOU HAVE A DRINK CONTAINING ALCOHOL: NEVER
HOW OFTEN DO YOU HAVE A DRINK CONTAINING ALCOHOL: NEVER
HOW MANY STANDARD DRINKS CONTAINING ALCOHOL DO YOU HAVE ON A TYPICAL DAY: PATIENT DOES NOT DRINK

## 2025-07-01 ASSESSMENT — PAIN - FUNCTIONAL ASSESSMENT: PAIN_FUNCTIONAL_ASSESSMENT: NONE - DENIES PAIN

## 2025-07-01 NOTE — ED PROVIDER NOTES
[facility]  EMERGENCY DEPARTMENT ENCOUNTER      Pt Name: Blade Friedman  MRN: [id number]  Birthdate 1985  Date of evaluation: 7/1/2025  Provider: Rey Hernandez DO    CHIEF COMPLAINT       Chief Complaint   Patient presents with    Suicidal         HISTORY OF PRESENT ILLNESS   (Location/Symptom, Timing/Onset, Context/Setting, Quality, Duration, Modifying Factors, Severity)  Note limiting factors.   39-year-old male comes in under TDO.  Patient arrives without insurance.  Apparently per the patient, his wife is \"been talking to\" another man in [place].  Today they got into an argument about it and the wife threatened to leave with the kids and the animals.  Patient states that he said that if she did that he would have no reason to live.  This is the only admission that the patient makes.  Per police, a call came out to them by dispatch for possible attempt to death by .  The patient was found in a car with a pistol that he had apparently just unloaded after he saw the police cars show up.  He mention to one of the officers that he was actively suicidal and was placed under TDO and brought to the emergency department.  He denies drug or alcohol use.  He denies any suicidal ideations in the past or any other psychiatric admissions            Review of External Medical Records:     Nursing Notes were reviewed.    REVIEW OF SYSTEMS    (2-9 systems for level 4, 10 or more for level 5)     Review of Systems    Except as noted above the remainder of the review of systems was reviewed and negative.       PAST MEDICAL HISTORY     Past Medical History:   Diagnosis Date    Allergic rhinitis due to pollen     Asthma     Bipolar I disorder, most recent episode (or current) depressed, unspecified     Depression     Gastrointestinal disorder     gerd    GERD (gastroesophageal reflux disease)     Otitis media     Tick bite 6/8/2010         SURGICAL HISTORY       Past Surgical

## 2025-07-01 NOTE — ED NOTES
Patient currently denies suicidal ideation. States these were \"empty threats\". States he has had no clarification of the activity that his spouse has been involved in, but today, she became angry and told him that she was leaving. Patient then states he told his spouse that if she was leaving, then his life was not important. Patient denies accusation that he had a gun to his head.

## 2025-07-01 NOTE — ED TRIAGE NOTES
Patient presents to treatment area via police with [facility] in place for suicidal ideation. Patient states his spouse has been cheating on him and was going to leave him. States she told him she was going to move and take the kids and the dog. Patient then reportedly put a gun to his head. Reports active suicidal ideation due to circumstances.

## 2025-07-01 NOTE — ED NOTES
Suicide screening yields \"low risk\" based on patient responses. Due to the reason for patient presentation, however, patient will be placed on high risk for suicide, despite this screening result.

## 2025-07-02 ENCOUNTER — HOSPITAL ENCOUNTER (INPATIENT)
Facility: HOSPITAL | Age: 40
LOS: 1 days | Discharge: HOME | DRG: 885 | End: 2025-07-03
Attending: STUDENT IN AN ORGANIZED HEALTH CARE EDUCATION/TRAINING PROGRAM | Admitting: STUDENT IN AN ORGANIZED HEALTH CARE EDUCATION/TRAINING PROGRAM
Payer: MEDICARE

## 2025-07-02 VITALS
RESPIRATION RATE: 16 BRPM | TEMPERATURE: 97.5 F | DIASTOLIC BLOOD PRESSURE: 55 MMHG | OXYGEN SATURATION: 96 % | SYSTOLIC BLOOD PRESSURE: 102 MMHG | WEIGHT: 293 LBS | BODY MASS INDEX: 41.02 KG/M2 | HEART RATE: 82 BPM | HEIGHT: 71 IN

## 2025-07-02 PROBLEM — F31.4 BIPOLAR DISORDER, CURRENT EPISODE DEPRESSED, SEVERE, WITHOUT PSYCHOTIC FEATURES (HCC): Status: ACTIVE | Noted: 2025-07-02

## 2025-07-02 PROCEDURE — 6370000000 HC RX 637 (ALT 250 FOR IP): Performed by: PSYCHIATRY & NEUROLOGY

## 2025-07-02 PROCEDURE — 94761 N-INVAS EAR/PLS OXIMETRY MLT: CPT

## 2025-07-02 PROCEDURE — 1240000000 HC EMOTIONAL WELLNESS R&B

## 2025-07-02 RX ORDER — PANTOPRAZOLE SODIUM 40 MG/1
40 TABLET, DELAYED RELEASE ORAL
Status: DISCONTINUED | OUTPATIENT
Start: 2025-07-02 | End: 2025-07-03 | Stop reason: HOSPADM

## 2025-07-02 RX ORDER — OMEPRAZOLE 20 MG/1
40 CAPSULE, DELAYED RELEASE ORAL DAILY
COMMUNITY

## 2025-07-02 RX ORDER — MAGNESIUM HYDROXIDE/ALUMINUM HYDROXICE/SIMETHICONE 120; 1200; 1200 MG/30ML; MG/30ML; MG/30ML
30 SUSPENSION ORAL EVERY 6 HOURS PRN
Status: DISCONTINUED | OUTPATIENT
Start: 2025-07-02 | End: 2025-07-03 | Stop reason: HOSPADM

## 2025-07-02 RX ORDER — POLYETHYLENE GLYCOL 3350 17 G/17G
17 POWDER, FOR SOLUTION ORAL DAILY PRN
Status: DISCONTINUED | OUTPATIENT
Start: 2025-07-02 | End: 2025-07-03 | Stop reason: HOSPADM

## 2025-07-02 RX ORDER — HALOPERIDOL 5 MG/1
5 TABLET ORAL EVERY 4 HOURS PRN
Status: DISCONTINUED | OUTPATIENT
Start: 2025-07-02 | End: 2025-07-03 | Stop reason: HOSPADM

## 2025-07-02 RX ORDER — ACETAMINOPHEN 325 MG/1
650 TABLET ORAL EVERY 4 HOURS PRN
Status: DISCONTINUED | OUTPATIENT
Start: 2025-07-02 | End: 2025-07-03 | Stop reason: HOSPADM

## 2025-07-02 RX ORDER — BUSPIRONE HYDROCHLORIDE 10 MG/1
5 TABLET ORAL 3 TIMES DAILY
Status: DISCONTINUED | OUTPATIENT
Start: 2025-07-02 | End: 2025-07-03 | Stop reason: HOSPADM

## 2025-07-02 RX ORDER — HYDROXYZINE HYDROCHLORIDE 25 MG/1
50 TABLET, FILM COATED ORAL 3 TIMES DAILY PRN
Status: DISCONTINUED | OUTPATIENT
Start: 2025-07-02 | End: 2025-07-03 | Stop reason: HOSPADM

## 2025-07-02 RX ORDER — HALOPERIDOL 5 MG/ML
5 INJECTION INTRAMUSCULAR EVERY 4 HOURS PRN
Status: DISCONTINUED | OUTPATIENT
Start: 2025-07-02 | End: 2025-07-03 | Stop reason: HOSPADM

## 2025-07-02 RX ORDER — TRAZODONE HYDROCHLORIDE 50 MG/1
50 TABLET ORAL NIGHTLY PRN
Status: DISCONTINUED | OUTPATIENT
Start: 2025-07-02 | End: 2025-07-03 | Stop reason: HOSPADM

## 2025-07-02 RX ORDER — DIPHENHYDRAMINE HYDROCHLORIDE 50 MG/ML
50 INJECTION, SOLUTION INTRAMUSCULAR; INTRAVENOUS EVERY 4 HOURS PRN
Status: DISCONTINUED | OUTPATIENT
Start: 2025-07-02 | End: 2025-07-03 | Stop reason: HOSPADM

## 2025-07-02 RX ADMIN — HYDROXYZINE HYDROCHLORIDE 50 MG: 25 TABLET ORAL at 21:11

## 2025-07-02 RX ADMIN — PANTOPRAZOLE SODIUM 40 MG: 40 TABLET, DELAYED RELEASE ORAL at 13:56

## 2025-07-02 RX ADMIN — ACETAMINOPHEN 650 MG: 325 TABLET ORAL at 11:46

## 2025-07-02 RX ADMIN — BUSPIRONE HYDROCHLORIDE 5 MG: 10 TABLET ORAL at 13:56

## 2025-07-02 RX ADMIN — BUSPIRONE HYDROCHLORIDE 5 MG: 10 TABLET ORAL at 21:07

## 2025-07-02 ASSESSMENT — PAIN SCALES - GENERAL
PAINLEVEL_OUTOF10: 0
PAINLEVEL_OUTOF10: 6

## 2025-07-02 ASSESSMENT — SLEEP AND FATIGUE QUESTIONNAIRES
DO YOU USE A SLEEP AID: NO
DO YOU HAVE DIFFICULTY SLEEPING: NO
AVERAGE NUMBER OF SLEEP HOURS: 8

## 2025-07-02 ASSESSMENT — PAIN DESCRIPTION - LOCATION: LOCATION: HEAD

## 2025-07-02 ASSESSMENT — LIFESTYLE VARIABLES
HOW OFTEN DO YOU HAVE A DRINK CONTAINING ALCOHOL: NEVER
HOW MANY STANDARD DRINKS CONTAINING ALCOHOL DO YOU HAVE ON A TYPICAL DAY: PATIENT DOES NOT DRINK

## 2025-07-02 ASSESSMENT — PAIN DESCRIPTION - DESCRIPTORS: DESCRIPTORS: ACHING

## 2025-07-02 NOTE — PROGRESS NOTES
[facility] Pharmacy Admission Medication History    Information obtained from: Patient interview  RxQuery data available1: No    Comments/recommendations:  Patient shared he does not currently have prescription insurance.  Pharmacist will provide medication savings card prior to discharge.     Medication changes (since last review):  Added  Omeprazole  Not Taking  Sertraline  Dextromethorphan  Adjusted  None     1RxQuery pharmacy benefit data reflects medications filled and processed through the patient's insurance, however                this data does NOT capture whether the medication was picked up or is currently being taken by the patient.       Patient allergies:   Allergies as of 07/02/2025    (No Known Allergies)         Prior to Admission Medications   Prescriptions Last Dose Informant   omeprazole (PRILOSEC) 20 MG delayed release capsule 7/1/2025 Self   Sig: Take 2 capsules by mouth daily      Facility-Administered Medications: None          Thank you,  Brisa Zuñiga, PharmD, BCPS, BCPP  Clinical Pharmacy Specialist, [facility] phone: [phone]  Pharmacy: [phone] ([phone])

## 2025-07-02 NOTE — GROUP NOTE
Group Therapy Note    Date: 7/2/2025    Group Start Time: 1100  Group End Time: 1145  Group Topic: Relaxation    [facility] ACUTE [facility]    Jemma Woodruff        Group Therapy Note    Attendees: 2       Patient's Goal:  To participate in relaxation activity    Notes:  Pt did not attend session  Discipline Responsible: Recreational Therapist      Signature:  JEMMA WOODRUFF

## 2025-07-02 NOTE — ED NOTES
ED SIGN OUT NOTE  Care assumed at [facility] 11:37 PM EDT    Patient was signed out to me by Dr. Hernandez.     Patient is awaiting TDO and placement.    /89   Pulse 86   Temp 98.1 °F (36.7 °C) (Oral)   Resp 16   Ht 1.803 m (5' 11\")   Wt 132.9 kg (293 lb)   SpO2 98%   BMI 40.87 kg/m²     Labs Reviewed   COMPREHENSIVE METABOLIC PANEL - Abnormal; Notable for the following components:       Result Value    Glucose 105 (*)     Alk Phosphatase 118 (*)     All other components within normal limits   SALICYLATE LEVEL - Abnormal; Notable for the following components:    Salicylate Lvl <1.7 (*)     All other components within normal limits   ACETAMINOPHEN LEVEL - Abnormal; Notable for the following components:    Acetaminophen Level <2 (*)     All other components within normal limits   URINALYSIS WITH MICROSCOPIC - Abnormal; Notable for the following components:    Appearance HAZY (*)     Mucus, UA 2+ (*)     All other components within normal limits   CBC WITH AUTO DIFFERENTIAL   ETHANOL   URINE DRUG SCREEN     No orders to display       ED Course as of 07/01/25 2337   Tue Jul 01, 2025 2040 EKG 2035  Dependent evaluation shows a normal sinus rhythm at 79 bpm.  Normal axis.  Normal intervals.  No STEMI [GG]   2325 Appreciate conversation with Dr. Hernandez who noted that as he did not have a sitter at the freestanding patient would be transferred here for TDO.  Patient is medically cleared [JE]      ED Course User Index  [GG] Rey Hernandez, DO  [JE] Lexii Hyde MD       Diagnosis:   1. Suicidal ideation        Disposition:   [facility] Hold 07/01/2025 08:29:10 PM    Plan:   TDO and inpt mental  health placement    MD Mary Ellen SOLIS Jill, MD  07/01/25 1967

## 2025-07-02 NOTE — BSMART NOTE
BSMART Team Note     LOS:  11:28     Patient goal(s) for today: take medications as prescribed, make needs known in an appropriate manner,   BSMART team focus/goals: assess needs, provide support and education, coordinate care,     Progress note: This writer met with patient via telehealth. Patient denied suicidal, homicidal thoughts and hallucinations. Patient presents with low mood. Patient reported he informed [facility] that he made empty treats and that he had no intentions on harming himself. Patient questioned this writer when he could go home and this writer explained to patient the process  that was currently taking place.      Admission Status: TDO  TDO Execution Date and Time: 7/2/2025 @ 12:32a  Commitment Hearing Scheduled: No      Date and Time:  n/a  CSB Updates: Patient being admitted to [facility]     Barriers to Discharge: no safety plan discussed with JESSICA Dhillon in the home: Yes   Outpatient provider(s):  none  Insurance info/prescription coverage:      Diagnosis: Unspecified Mood Disorder    Plan:  Admit U.   Consult to Tele-Psych Ordered? No  Evaluated by Tele-Psych? No  Tele-Psych Re-eval completed? No        Participating treatment team members: Apryl Morgan MA

## 2025-07-02 NOTE — TRANSITION OF CARE
no  Financial Resources & Benefits: Full income from employer  Health History & Current Medical Needs:  Allergies:   Allergies  Fully Reviewed by Odessa Figueroa RN on 7/1/2025   No Known Allergies       Recent Complaints/Conditions: no  Nutritional Needs: no  Chronic Conditions: yes - GERD  Communicable Diseases: no  Activity Restrictions: no  Special Supervision/Protocols: no  Past Illnesses/Injuries/Hospitalizations: no  Family Medical History:  Family History   Problem Relation Age of Onset    Diabetes Father      Substance Use (Alcohol, Prescription, Nonprescription, Illicit): no  Psychiatric/Substance Use Issues:  Current Needs: Medication adjustments for anxiety, referral to CSB  Co-occurring Disorders: None  History of Use/Abuse: No substance abuse  Risk Factors: Access to weapons/handguns  History of Abuse/Neglect/Trauma: no  []Incarceration []Homelessness   []Physical Abuse []Sexual Abuse   []Emotional Abuse [x]Does not disclose/Other   []Childhood []Adulthood     Criminal History (Charges/Convictions/[place]/Probation): no  Daily Living Skills:  Limitations: None identified  Housing Arrangements:   The patient lives with a spouse.  Address on file: [address] [place] [address] ([place]) lives with wife and children  Service Access & Transportation Needs: no, will refer to CSB due to pt not having insurance   As applicable, and in all residential services :  Fall Risk: no  Communication Methods/Needs: no  Mobility & Adaptive Equipment: no

## 2025-07-02 NOTE — PLAN OF CARE
Problem: Risk for Elopement  Goal: Patient will not exit the unit/facility without proper excort  Outcome: Progressing     Problem: Discharge Planning  Goal: Discharge to home or other facility with appropriate resources  Outcome: Not Progressing

## 2025-07-02 NOTE — GROUP NOTE
Group Therapy Note    Date: 7/2/2025    Group Start Time: 1400  Group End Time: 1500  Group Topic: Recreational    [facility]    Jemma Woodruff        Group Therapy Note           Patient's Goal:  To concentrate on selected task    Notes:  Pt did not attend session  Discipline Responsible: Recreational Therapist      Signature:  JEMMA WOODRUFF

## 2025-07-02 NOTE — ED NOTES
TRANSFER - OUT REPORT:    Verbal report given to H 2 H on Blade Friedman  being transferred to [facility] for routine progression of patient care       Report consisted of patient's Situation, Background, Assessment and   Recommendations(SBAR).     Information from the following report(s) ED SBAR was reviewed with the receiving nurse.    [place] Fall Assessment:                           Lines:   Peripheral IV 07/01/25 Right Antecubital (Active)   Site Assessment Clean, dry & intact 07/01/25 1952   Line Status Brisk blood return 07/01/25 1952   Line Care Cap changed 07/01/25 1952   Phlebitis Assessment No symptoms 07/01/25 1952   Infiltration Assessment 0 07/01/25 1952   Alcohol Cap Used No 07/01/25 1952   Dressing Status Clean, dry & intact 07/01/25 1952   Dressing Type Transparent 07/01/25 1952   Dressing Intervention New 07/01/25 1952        Opportunity for questions and clarification was provided.

## 2025-07-02 NOTE — ED NOTES
The rest of the patients belongings are secured by security and placed in belonging bag with patient labels applied, security at the bedside to wand the patient, and two [place] police officers at the bedside.

## 2025-07-02 NOTE — BH NOTE
Per GRACE Moss, [facility] and does not require a 1:1 on the BHU at this time.

## 2025-07-02 NOTE — ED NOTES
TRANSFER - OUT REPORT:    Verbal report given to Devon CADENA on Blade Friedman  being transferred to [facility] for routine progression of patient care       Report consisted of patient's Situation, Background, Assessment and   Recommendations(SBAR).     Information from the following report(s) ED SBAR was reviewed with the receiving nurse.    [place] Fall Assessment:                           Lines:   Peripheral IV 07/01/25 Right Antecubital (Active)   Site Assessment Clean, dry & intact 07/01/25 1952   Line Status Brisk blood return 07/01/25 1952   Line Care Cap changed 07/01/25 1952   Phlebitis Assessment No symptoms 07/01/25 1952   Infiltration Assessment 0 07/01/25 1952   Alcohol Cap Used No 07/01/25 1952   Dressing Status Clean, dry & intact 07/01/25 1952   Dressing Type Transparent 07/01/25 1952   Dressing Intervention New 07/01/25 1952        Opportunity for questions and clarification was provided.

## 2025-07-02 NOTE — H&P
History and Physical    Date of Service:  7/2/2025  Primary Care Provider: Javier Payne MD  Source of information: patient, nurse, chart review    Chief Complaint: No chief complaint on file.      History of Presenting Illness:   Blade Friedman is a 39-year-old man with past medical history of GERD was brought in by the police under TDO to the ED due to concerns of suicide ideation.  Police reportedly stated patient was pending to commit suicide.  Patient had endorsed being under a lot of stress and saying things to the police that he later regretted.  Admits to depression.  Vital signs, significant labs, physical exam relatively benign.  Patient was medically cleared by emergency department and transferred to [facility].  Internal medicine was consulted for H&P.    On evaluation patient had no complaints. Currently, patient denies headache, vision or hearing changes, fever, chills, weakness, chest pain, dyspnea, cough, abd pain, N,V,D,C, blood in stool, melena, blood in urine, LE edema, numbness or tingling in extremities.         REVIEW OF SYSTEMS:  Per HPI     Past Medical History:   Diagnosis Date    Allergic rhinitis due to pollen     Asthma     Bipolar I disorder, most recent episode (or current) depressed, unspecified     Depression     Gastrointestinal disorder     gerd    GERD (gastroesophageal reflux disease)     Otitis media     Tick bite 6/8/2010      Past Surgical History:   Procedure Laterality Date    ADENOIDECTOMY      age 10    STRABISMUS SURG,REPAIR DETACH MUSC      TONSILLECTOMY      age 10    TYMPANOPLASTY      UPPER GASTROINTESTINAL ENDOSCOPY       Prior to Admission medications    Medication Sig Start Date End Date Taking? Authorizing Provider   omeprazole (PRILOSEC) 20 MG delayed release capsule Take 2 capsules by mouth daily   Yes Provider, MD Yael   dextromethorphan (DELSYM) 30 MG/5ML extended release liquid Take by mouth 2 times daily  Patient not taking: Reported on 7/1/2025 
screen is negative.    PAST MEDICAL HISTORY:  Reviewed as per the history and physical exam.      Past Medical History:   Diagnosis Date    Allergic rhinitis due to pollen     Asthma     Bipolar I disorder, most recent episode (or current) depressed, unspecified     Depression     Gastrointestinal disorder     gerd    GERD (gastroesophageal reflux disease)     Otitis media     Tick bite 6/8/2010     Prior to Admission medications    Medication Sig Start Date End Date Taking? Authorizing Provider   omeprazole (PRILOSEC) 20 MG delayed release capsule Take 2 capsules by mouth daily   Yes Provider, Yael, MD   dextromethorphan (DELSYM) 30 MG/5ML extended release liquid Take by mouth 2 times daily  Patient not taking: Reported on 7/1/2025    Automatic Reconciliation, Ar   sertraline (ZOLOFT) 50 MG tablet Take by mouth daily  Patient not taking: Reported on 7/1/2025    Automatic Reconciliation, Ar     [unfilled]  Lab Results   Component Value Date    WBC 9.1 07/01/2025    HGB 15.2 07/01/2025    HCT 44.1 07/01/2025    MCV 84.8 07/01/2025     07/01/2025     Lab Results   Component Value Date     07/01/2025    K 3.6 07/01/2025     07/01/2025    CO2 29 07/01/2025    BUN 11 07/01/2025    CREATININE 0.83 07/01/2025    GLUCOSE 105 (H) 07/01/2025    CALCIUM 9.0 07/01/2025    BILITOT 0.6 07/01/2025    ALKPHOS 118 (H) 07/01/2025    AST 20 07/01/2025    ALT 32 07/01/2025    LABGLOM >90 07/01/2025    GFRAA >60 04/19/2022    AGRATIO 1.0 (L) 10/24/2022    GLOB 3.6 07/01/2025         No results found for: \"VALAC\", \"VALP\", \"CARB2\"  No results found for: \"LITHM\"  RADIOLOGY REPORTS:(reviewed/updated 7/3/2025)  [unfilled]  @[facility]@      PAST PSYCHIATRIC HISTORY:  The patient reports that he was evaluated for anxiety and depression 10 or 12 years ago and tried several antidepressants, but found that all of them make him tired and sleepy.  This included Wellbutrin, Zoloft, and Prozac.  He denies any prior psychiatric

## 2025-07-02 NOTE — BSMART NOTE
Patient is awake now and attending nurse will set up for rounding.     Patient has been admitted to [facility] [id number] Dr. Moss/ Dr. Murrell nurse report [phone]

## 2025-07-03 VITALS
OXYGEN SATURATION: 97 % | HEIGHT: 69 IN | RESPIRATION RATE: 18 BRPM | HEART RATE: 89 BPM | WEIGHT: 296.2 LBS | DIASTOLIC BLOOD PRESSURE: 77 MMHG | SYSTOLIC BLOOD PRESSURE: 129 MMHG | BODY MASS INDEX: 43.87 KG/M2 | TEMPERATURE: 97.5 F

## 2025-07-03 LAB
EKG ATRIAL RATE: 79 BPM
EKG DIAGNOSIS: NORMAL
EKG P AXIS: 31 DEGREES
EKG P-R INTERVAL: 192 MS
EKG Q-T INTERVAL: 376 MS
EKG QRS DURATION: 80 MS
EKG QTC CALCULATION (BAZETT): 431 MS
EKG R AXIS: -24 DEGREES
EKG T AXIS: 63 DEGREES
EKG VENTRICULAR RATE: 79 BPM

## 2025-07-03 PROCEDURE — 93010 ELECTROCARDIOGRAM REPORT: CPT | Performed by: SPECIALIST

## 2025-07-03 PROCEDURE — 6370000000 HC RX 637 (ALT 250 FOR IP): Performed by: PSYCHIATRY & NEUROLOGY

## 2025-07-03 RX ORDER — BUSPIRONE HYDROCHLORIDE 5 MG/1
5 TABLET ORAL 3 TIMES DAILY
Qty: 90 TABLET | Refills: 0 | Status: SHIPPED | OUTPATIENT
Start: 2025-07-03

## 2025-07-03 RX ADMIN — BUSPIRONE HYDROCHLORIDE 5 MG: 10 TABLET ORAL at 08:50

## 2025-07-03 RX ADMIN — PANTOPRAZOLE SODIUM 40 MG: 40 TABLET, DELAYED RELEASE ORAL at 08:50

## 2025-07-03 RX ADMIN — BUSPIRONE HYDROCHLORIDE 5 MG: 10 TABLET ORAL at 14:02

## 2025-07-03 NOTE — BH NOTE
[facility]  Admission Note     Admission Type:   Admission Type: Involuntary    Reason for admission:  Reason for Admission: SI    PATIENT STRENGTHS:   Patient employed  Patient has support system   Patient is not homeless    Patient admitted to [facility] from [facility] ED. Patient arrived to hospital. This RN and Dimple RN met patient in ED, helped patient dress in blue paper scrubs. Patient escorted to [facility] by RNs and security. Patient calm and cooperative during vitals and admission assessment. Patient alert and oriented x4. Patient's vitals stable. Patient walks with steady gait, makes good eye contact, and speech is clear. Dual skin assessment completed by this RN and Dimple RN. Skin is WDL. Patient appears worried as evident by patient's facial expression and tone of voice. Patient reported, \"I want to go home to see my kids before my wife takes them away while I'm here.\" \"I'm no longer a threat; I just made one little statement when I was upset.\" Patient denies anxiety, depression, SI, HI, and AVH. Patient scores no risk on CSSRS screening. Provider notified of score and patient presentation. Patient will be monitored on Q15 minute observations.     Medical Problems:   Past Medical History:   Diagnosis Date    Allergic rhinitis due to pollen     Asthma     Bipolar I disorder, most recent episode (or current) depressed, unspecified     Depression     Gastrointestinal disorder     gerd    GERD (gastroesophageal reflux disease)     Otitis media     Tick bite 6/8/2010       Status EXAM:  Mental Status and Behavioral Exam  Normal: No  Level of Assistance: Independent/Self  Facial Expression: Worried  Affect: Appropriate  Level of Consciousness: Alert  Frequency of Checks: 4 times per hour, close  Mood:Normal: No  Mood: Sad, Anxious  Motor Activity:Normal: Yes  Eye Contact: Good  Observed Behavior: Cooperative  Sexual Misconduct History: Current - no  Preception: Irving to person, [place] to time, [place] to 
Discharge Note:    Patient discharged home. Patient is free of SI and HI. Patient given back belongings, valuable and a copy of discharge instructions. Writer went over discharge instructions with patient. Patient verbalized understanding. Patient escorted down to ED entrance.  
Shift assessment (9436-3165):    Writer met patient in room. Patient lying in bed, Patient alert and oriented x4. Patient calm and cooperative during vitals and assessment. Patient vitals stable. Patient well groomed and appropriately dressed. Patient makes good eye contact, walks with a steady gait, and speaks with a clear speech. Patient presents with a brightened facial expression and appropriate affect. Patent inquired if he was being discharged today. Patient denies anxiety, depression, SI, HI, and AVH. Patient is free of acute stress. Scores no risk on CSSRS screening. Q15 minute checks in place for safety.   
Interventions & Outcomes:   Prior hospitalizations: no  Financial Resources & Benefits: Full income from employer  Health History & Current Medical Needs:  Allergies:   Allergies  Fully Reviewed by Kelly Busch RN on 7/2/2025   No Known Allergies       Recent Complaints/Conditions: no  Nutritional Needs: no  Chronic Conditions: yes - GERD  Communicable Diseases: no  Activity Restrictions: no  Special Supervision/Protocols: no  Past Illnesses/Injuries/Hospitalizations: no  Family Medical History:  Family History   Problem Relation Age of Onset    Diabetes Father      Substance Use (Alcohol, Prescription, Nonprescription, Illicit): no  Psychiatric/Substance Use Issues:  Current Needs: Medication adjustments for anxiety, referral to [facility]  Co-occurring Disorders: None  History of Use/Abuse: No substance abuse  Risk Factors: Access to weapons/handguns  History of Abuse/Neglect/Trauma: no  []Incarceration []Homelessness   []Physical Abuse []Sexual Abuse   []Emotional Abuse [x]Does not disclose/Other   []Childhood []Adulthood     Criminal History (Charges/Convictions/[place]/Probation): no  Daily Living Skills:  Limitations: None identified  Housing Arrangements:   The patient lives with a spouse.  Address on file: [address] [place] [address] ([place]) lives with wife and children  Service Access & Transportation Needs: no, will refer to [facility] due to pt not having insurance   As applicable, and in all residential services :  Fall Risk: no  Communication Methods/Needs: no  Mobility & Adaptive Equipment: no  
and in agreement with discharge plan? Yes     Arrangements for medication:  Prescriptions sent to [facility]     Copy of discharge instructions to provider?:  Yes     Arrangements for transportation home:  Family     Keep all follow up appointments as scheduled, continue to take prescribed medications per physician instructions.  Mental health crisis number:  911 or your local mental health crisis line number at [phone]        Mental Health Emergency WARM LINE      [phone]-MHAV ([phone])      M-F: 9am to 9pm      Sat & Sun: 5pm - 9pm  National suicide prevention lines:                             7-796-NMWAPFY (1-223.407.2316)       2-746-260-TALK (1-255.105.5233)   24/7 Crisis Text Line:  Text HOME to 915310    Discharge Medication List and Instructions:      Medication List        START taking these medications      busPIRone 5 MG tablet  Commonly known as: BUSPAR  Take 1 tablet by mouth 3 times daily            CONTINUE taking these medications      omeprazole 20 MG delayed release capsule  Commonly known as: PRILOSEC            STOP taking these medications      dextromethorphan 30 MG/5ML extended release liquid  Commonly known as: DELSYM     sertraline 50 MG tablet  Commonly known as: ZOLOFT               Where to Get Your Medications        These medications were sent to [facility] [address] [place], [place] [address]KATHERIN - P [phone] - F [phone]  [address] [place] [address]      Phone: [phone]   busPIRone 5 MG tablet         Unresulted Labs (24h ago, onward)      None            To obtain results of studies pending at discharge, please contact [phone] option 4.      Follow-up Information       Follow up With Specialties Details Why Contact [facility] Mental Health Support Services  Go to \"[facility] program is the initial point of contact for information and service requests for a wide range of mental health services and programs. Our services include:

## 2025-07-03 NOTE — DISCHARGE SUMMARY
DISCHARGE SUMMARY    Some parts of the discharge summary are from the initial Psychiatric interview that was done on admission by the admitting psychiatrist.      Date of Admission: 7/2/2025    Date of Discharge:7/3/2025     TYPE OF DISCHARGE:   REGULAR -  YES    AMA - NO  RELEASED BY THE TDO COURT - NO    ADMISSION EVALUATION:  CHIEF COMPLAINT:  \"I feel okay today.\"     HISTORY OF PRESENT ILLNESS:  The patient is a 39-year-old  man, who is currently admitted on a TDO after his wife called the police.  They had reportedly had an argument where he is threatening to commit suicide by driving his car into traffic or attacking a  so that he would commit suicide by .  When I interviewed the patient, he was calm and cooperative.  He admits that he said all of these things, but states that he only said them in anger at his wife.  He had reportedly found text messages on his wife's phone, which indicated that she may be unfaithful to him and was very upset about this.  He regrets saying what he said and states he has no intention of harming himself or anybody else.  He was reportedly found in his car by the police with a gun on his person and he had reportedly held a gun at his head and threatened to shoot himself.  The patient as a mental health court hearing schedule either today or tomorrow.  He denies any symptoms suggestive of depression.  States that he is sleeping well and would like to go back to work.  He was released by the court.  Denies any neurovegetative symptoms of depression.  Denies any symptoms suggestive of filiberto or hypomania.  Since his arrival on the unit, he has been calm and cooperative.  He admits that he tends to feel anxious and being in a situation like the psychiatric unit makes him even more anxious and agrees to start on BuSpar.  He denies any auditory or visual hallucinations.  Denies use of any recreational substances and urine drug screen is negative.     PAST

## 2025-07-03 NOTE — PROGRESS NOTES
RN Shift Assessment Note:    Blade was met in the day room where he was observed sitting and talking to staff. Pt was observed to be visible and social during this shift.   Pt did not display any signs of distress or complaints of adverse reactions to medications. Pt denies pain, SI, HI, AVH, depression and anxiety. Pt stated that his mood is good. Pt was calm and cooperative during assessment. Pt will continue to be monitored for his safety. Pt slept for a total of 8 hrs.

## 2025-07-03 NOTE — GROUP NOTE
Group Therapy Note    Date: 7/3/2025    Group Start Time: 1100  Group End Time: 1145  Group Topic: Relaxation    [facility]    Jemma Woodruff        Group Therapy Note           Patient's Goal:  To participate in relaxation activity    Notes:  Pt did not attend session    Discipline Responsible: Recreational Therapist      Signature:  JEMMA WOODRUFF

## 2025-07-03 NOTE — DISCHARGE INSTRUCTIONS
DISCHARGE SUMMARY    NAME:Blade Friedman  : 1985  MRN: [id number]    The patient Blade Friedman exhibits the ability to control behavior in a less restrictive environment.  Patient's level of functioning is improving.  No assaultive/destructive behavior has been observed for the past 24 hours.  No suicidal/homicidal threat or behavior has been observed for the past 24 hours.  There is no evidence of serious medication side effects.  Patient has not been in physical or protective restraints for at least the past 24 hours.    If weapons involved, how are they secured? In gun safe    Is patient aware of and in agreement with discharge plan? Yes    Arrangements for medication:  Prescriptions sent to [facility]    Copy of discharge instructions to provider?:  Yes    Arrangements for transportation home:  Family    Keep all follow up appointments as scheduled, continue to take prescribed medications per physician instructions.  Mental health crisis number:  911 or your local mental health crisis line number at [phone]      Mental Health Emergency WARM LINE      [phone]-MHAV [phone])      M-F: 9am to 9pm      Sat & Sun: 5pm - 9pm  National suicide prevention lines:                             6-342-RCETWSG (9-260-913-4483)       7-812-842-TALK ([phone])    Crisis Text Line:  Text HOME to 061913

## 2025-07-03 NOTE — PLAN OF CARE
Problem: Discharge Planning  Goal: Discharge to home or other facility with appropriate resources  Outcome: Progressing     Problem: Risk for Elopement  Goal: Patient will not exit the unit/facility without proper excort  Outcome: Progressing     Problem: Self Harm/Suicidality  Goal: Will have no self-injury during hospital stay  Description: INTERVENTIONS:  1.  Ensure constant observer at bedside with Q15M safety checks  2.  Maintain a safe environment  3.  Secure patient belongings  4.  Ensure family/visitors adhere to safety recommendations  5.  Ensure safety tray has been added to patient's diet order  6.  Every shift and PRN: Re-assess suicidal risk via Frequent Screener    7/3/2025 0945 by Kelly Busch, RN  Outcome: Progressing  7/3/2025 0610 by Aida Limon, RN  Outcome: Progressing